# Patient Record
Sex: FEMALE
[De-identification: names, ages, dates, MRNs, and addresses within clinical notes are randomized per-mention and may not be internally consistent; named-entity substitution may affect disease eponyms.]

---

## 2018-10-24 ENCOUNTER — HOSPITAL ENCOUNTER (EMERGENCY)
Dept: HOSPITAL 14 - H.ER | Age: 48
Discharge: HOME | End: 2018-10-24
Payer: COMMERCIAL

## 2018-10-24 VITALS
SYSTOLIC BLOOD PRESSURE: 90 MMHG | TEMPERATURE: 97.6 F | RESPIRATION RATE: 18 BRPM | DIASTOLIC BLOOD PRESSURE: 48 MMHG | HEART RATE: 63 BPM

## 2018-10-24 DIAGNOSIS — N20.2: Primary | ICD-10-CM

## 2018-10-24 LAB
ALBUMIN SERPL-MCNC: 4.8 G/DL (ref 3.5–5)
ALBUMIN/GLOB SERPL: 1.3 {RATIO} (ref 1–2.1)
ALT SERPL-CCNC: 42 U/L (ref 9–52)
ANISOCYTOSIS BLD QL SMEAR: SLIGHT
AST SERPL-CCNC: 31 U/L (ref 14–36)
BASOPHILS # BLD AUTO: 0 K/UL (ref 0–0.2)
BASOPHILS NFR BLD: 0.2 % (ref 0–2)
BUN SERPL-MCNC: 17 MG/DL (ref 7–17)
CALCIUM SERPL-MCNC: 9.8 MG/DL (ref 8.4–10.2)
EOSINOPHIL # BLD AUTO: 0.1 K/UL (ref 0–0.7)
EOSINOPHIL NFR BLD: 0.4 % (ref 0–4)
ERYTHROCYTE [DISTWIDTH] IN BLOOD BY AUTOMATED COUNT: 13.2 % (ref 11.5–14.5)
GFR NON-AFRICAN AMERICAN: > 60
HGB BLD-MCNC: 12.8 G/DL (ref 12–16)
HYPOCHROMIC: SLIGHT
LYMPHOCYTE: 7 % (ref 20–50)
LYMPHOCYTES # BLD AUTO: 1.3 K/UL (ref 1–4.3)
LYMPHOCYTES NFR BLD AUTO: 8.8 % (ref 20–40)
MCH RBC QN AUTO: 30.8 PG (ref 27–31)
MCHC RBC AUTO-ENTMCNC: 33.7 G/DL (ref 33–37)
MCV RBC AUTO: 91.3 FL (ref 81–99)
MONOCYTE: 6 % (ref 0–10)
MONOCYTES # BLD: 0.7 K/UL (ref 0–0.8)
MONOCYTES NFR BLD: 5 % (ref 0–10)
NEUTROPHILS # BLD: 12.6 K/UL (ref 1.8–7)
NEUTROPHILS NFR BLD AUTO: 83 % (ref 42–75)
NEUTROPHILS NFR BLD AUTO: 85.6 % (ref 50–75)
NEUTS BAND NFR BLD: 4 % (ref 0–2)
NRBC BLD AUTO-RTO: 0.1 % (ref 0–0)
PLATELET # BLD EST: NORMAL 10*3/UL
PLATELET # BLD: 207 K/UL (ref 130–400)
PMV BLD AUTO: 8.9 FL (ref 7.2–11.7)
RBC # BLD AUTO: 4.17 MIL/UL (ref 3.8–5.2)
TOTAL CELLS COUNTED BLD: 100
WBC # BLD AUTO: 14.7 K/UL (ref 4.8–10.8)

## 2018-10-24 PROCEDURE — 96361 HYDRATE IV INFUSION ADD-ON: CPT

## 2018-10-24 PROCEDURE — 99283 EMERGENCY DEPT VISIT LOW MDM: CPT

## 2018-10-24 PROCEDURE — 81025 URINE PREGNANCY TEST: CPT

## 2018-10-24 PROCEDURE — 96374 THER/PROPH/DIAG INJ IV PUSH: CPT

## 2018-10-24 PROCEDURE — 85025 COMPLETE CBC W/AUTO DIFF WBC: CPT

## 2018-10-24 PROCEDURE — 80053 COMPREHEN METABOLIC PANEL: CPT

## 2018-10-24 PROCEDURE — 74176 CT ABD & PELVIS W/O CONTRAST: CPT

## 2018-10-24 NOTE — ED PDOC
HPI: Back


Time Seen by Provider: 10/24/18 18:22


Chief Complaint (Nursing): Back Pain


Additional Complaint(s): 





47 year old female with past medical history of kidney stones presents to the 

emergency department complaining of acute onset of right-sided back pain x 1 

hour. Patient describes pain as sharp, constant, with occasional radiation to 

the right flank and abdomen. Associated nausea and one episode of vomiting on 

arrival to the ED. Patient last had a kidney stone 30 years ago. Patient has not

taken any medication for pain. Denies fever, chills, diarrhea, hematochezia, 

hematemesis, urinary symptoms, headache, vision changes, chest pain, SOB, 

numbness, paresthesias. 





Past Medical History


Reviewed: Historical Data, Nursing Documentation, Vital Signs


Vital Signs: 


                                Last Vital Signs











Temp  98.5 F   10/24/18 17:47


 


Pulse  71   10/24/18 17:47


 


Resp  20   10/24/18 17:47


 


BP  135/68   10/24/18 17:47


 


Pulse Ox  100   10/24/18 17:47














- Medical History


PMH: Kidney Stones





- Family History


Family History: States: Unknown Family Hx





- Home Medications


Home Medications: 


                                Ambulatory Orders











 Medication  Instructions  Recorded


 


Ciprofloxacin [Cipro] 500 mg PO Q12H #13 tab 10/24/18


 


Ibuprofen [Motrin Tab] 600 mg PO Q6H PRN #30 tab 10/24/18


 


Tamsulosin [Flomax] 0.4 mg PO DAILY #7 cap 10/24/18


 


oxyCODONE/Acetaminophen [Percocet 1 tab PO Q6H PRN #10 tab 10/24/18





5/325 mg Tab]  














- Allergies


Allergies/Adverse Reactions: 


                                    Allergies











Allergy/AdvReac Type Severity Reaction Status Date / Time


 


iodine Allergy  RASH Verified 10/24/18 17:47


 


seafood Allergy  SWELLING Uncoded 10/24/18 17:47














Review of Systems


ROS Statement: Except As Marked, All Systems Reviewed And Found Negative


Constitutional: Negative for: Fever, Chills


Eyes: Negative for: Vision Change


Cardiovascular: Negative for: Chest Pain, Palpitations, Light Headedness


Respiratory: Negative for: Cough, Shortness of Breath


Gastrointestinal: Positive for: Nausea, Vomiting, Abdominal Pain.  Negative for:

Diarrhea, Constipation, Melena, Hematochezia, Hematemesis, Rectal Pain


Genitourinary Female: Negative for: Dysuria, Frequency, Incontinence, Hematuria,

Vaginal Discharge, Vaginal Bleeding


Musculoskeletal: Positive for: Back Pain (right).  Negative for: Neck Pain, Sh

oulder Pain, Arm Pain, Hand Pain, Leg Pain, Foot Pain


Skin: Negative for: Rash, Lesions, Bruising


Neurological: Negative for: Weakness, Numbness, Incoordination, Confusion, 

Headache, Dizziness





Physical Exam





- Reviewed


Nursing Documentation Reviewed: Yes


Vital Signs Reviewed: Yes





- Physical Exam


Appears: Positive for: Well, Non-toxic, No Acute Distress


Head Exam: Positive for: ATRAUMATIC, NORMAL INSPECTION, NORMOCEPHALIC


Skin: Positive for: Normal Color, Warm, DRY


Eye Exam: Positive for: EOMI, Normal appearance, PERRL


ENT: Positive for: Normal ENT Inspection


Neck: Positive for: Normal, Painless ROM


Cardiovascular/Chest: Positive for: Regular Rate, Rhythm


Respiratory: Positive for: CNT, Normal Breath Sounds


Pulses-Radial (L): 2+


Pulses-Radial (R): 2+


Gastrointestinal/Abdominal: Positive for: Normal Exam, Bowel Sounds 

(normoactive), Soft.  Negative for: Tenderness


Back: Positive for: Normal Inspection, R CVA Tenderness.  Negative for: 

Vertebral Tenderness, Decreased ROM, Muscle Spasm


Rectal: Positive for: Deferred


Extremity: Positive for: Normal ROM, Capillary Refill (<2z).  Negative for: 

Tenderness, Calf Tenderness, Deformity, Swelling


Lymphatic: Positive for: Normal Exam.  Negative for: Adenopathy


Neurologic/Psych: Positive for: Alert, Oriented, Gait (steady).  Negative for: 

Motor/Sensory Deficits





- Laboratory Results


Result Diagrams: 


                                 10/24/18 18:59





                                 10/24/18 18:59


Urine Pregnancy POC: Negative


Urine dip results: Positive for: Blood (moderate)





- ECG


O2 Sat by Pulse Oximetry: 100





Medical Decision Making


Medical Decision Making: 





Initial Plan:


--UA, culture


--CBC, CMP


--IVF


--Toradol


--Zofran





UA with moderate amount of blood, will get CT for stone search





On re-evaluation, pt describes pain as 1/10 without nausea. Pt resting 

comfortably in bed. 





1938


CT ABD / PELVIS W/O CONTRAST


FINDINGS: 


LUNG BASES: 


The lung bases appear clear. No pleural effusions are seen. 


LIVER: 


Unremarkable. 


GALLBLADDER AND BILE DUCTS: 


The gallbladder appears within normal limits. No radioopaque gallstones are s

een. No biliary ductal dilatation is evident. 


PANCREAS: 


Unremarkable. 


SPLEEN: 


Unremarkable. 


ADRENAL GLANDS: 


Unremarkable. 


KIDNEYS, URETERS, AND BLADDER: 


The kidneys appear within normal limits. There is no hydronephrosis or 

hydroureter.  There is an approximate 3 mm right UVJ calculus. 


STOMACH AND BOWEL: 


Unremarkable appearance of the stomach and bowel. No evidence of bowel 

obstruction. No evidence suggesting enteritis or colitis. 


APPENDIX: 


No evidence of acute appendicitis on CT examination. 


PERITONEUM: 


No free fluid. No free air. 


LYMPH NODES: 


No lymphadenopathy is evident. 


VASCULATURE: 


No evidence of abdominal aortic aneurysm. 


BONES: 


No aggressive appearing osseous lesion. No acute osseous pathology evident. 


IMPRESSION: 


No suspicious mass or lymphadenopathy or free fluid.  Approximate 3 mm right UVJ

calculus.  Clinical correlation advised.





Case and lab results discussed with Dr. Parson, who recommends discharge home 

with pain control, flomax, antibiotics, and urology followup. Will follow 

recommendations.


CBC remarkable for WBC count of 14.7. Dr. Parson made aware, continues to 

recommend discharge home.





Diagnostic studies and importance of followup discussed with patient, who 

understands and agrees with plan of care. Pt stable for discharge home.





Impression:


Kidney stone


Plan:


--Percocet, Ibuprofen


--Flomax


--Ciprofloxacin


--Increase fluids


--Followup with urology within 2 days


--Followup with PMD


--Return to ED for new or worsening symptoms





Disposition





- Clinical Impression


Clinical Impression: 


 Kidney stone








- Patient ED Disposition


Is Patient to be Admitted: No


Discussed With : Kaushik Parson


Comment: Diagnostic studies discussed. Recommends discharge home with pain 

control, antibiotics, flomax, and urology followup.


Counseled Patient/Family Regarding: Studies Performed, Diagnosis, Need For 

Followup, Rx Given





- Disposition


Referrals: 


Mason Duque MD [Medical Doctor] - 


Disposition: Routine/Home


Disposition Time: 22:00


Condition: IMPROVED


Additional Instructions: 


Take percocet every 6 hours as needed for pain


Take antibiotic every 12 hours for 1 week


Take Flomax every day for 1 week


FOllowup with urology within 2 days


Followup with primary doctor within 2 days


Return to ER for worsening or new symptoms


Prescriptions: 


Ciprofloxacin [Cipro] 500 mg PO Q12H #13 tab


Ibuprofen [Motrin Tab] 600 mg PO Q6H PRN #30 tab


 PRN Reason: Pain, Moderate (4-7)


oxyCODONE/Acetaminophen [Percocet 5/325 mg Tab] 1 tab PO Q6H PRN #10 tab


 PRN Reason: Pain, Severe (8-10)


Tamsulosin [Flomax] 0.4 mg PO DAILY #7 cap


Instructions:  Kidney Stones in Adults


Forms:  [facility] (English), [facility] ED School/Work Excuse

## 2018-10-25 NOTE — CT
Date of service: 



10/24/2018



PROCEDURE:  CT Abdomen and Pelvis without intravenous contrast



HISTORY:

Back pain.  Renal calculus disease suspected. 



COMPARISON:

None.



TECHNIQUE:

Unenhanced.  Neither IV nor oral contrast administered



Radiation dose:



Total exam DLP = 369.28 mGy-cm.



This CT exam was performed using one or more of the following dose 

reduction techniques: Automated exposure control, adjustment of the 

mA and/or kV according to patient size, and/or use of iterative 

reconstruction technique.



FINDINGS:



LOWER THORAX:

Unremarkable. 



LIVER:

Unremarkable. No gross lesion or ductal dilatation.  



GALLBLADDER AND BILE DUCTS:

Unremarkable. 



PANCREAS:

Unremarkable. No gross lesion or ductal dilatation.



SPLEEN:

Unremarkable. 



ADRENALS:

Unremarkable. No mass. 



KIDNEYS AND URETERS:

Unremarkable. No hydronephrosis. No solid mass. 



There is no evidence of hydronephrosis, right hydroureter related to 

3 mm calculus right ureterovesical junction. 



VASCULATURE:

Unremarkable. No aortic aneurysm. No atherosclerotic calcification or 

mural plaque present.



BOWEL:

Constipation without fecal impaction or obstruction.  



APPENDIX:

As visualized, no abnormalities to suggest acute appendicitis. No 

right lower quadrant inflammatory processes identified.



PERITONEUM:

Unremarkable. No free fluid. No free air. 



LYMPH NODES:

Unremarkable. No enlarged lymph nodes. 



BLADDER:

3 mm calculus right ureterovesical junction. 



REPRODUCTIVE:

Unremarkable. 



BONES:

No acute fracture. 



OTHER FINDINGS:

None.



IMPRESSION:

Nonobstructing calculus right ureterovesical junction measures 3 mm.



Additional benign and/or incidental findings described above.



________________________________________________



Concordant results (preliminary interpretation) provided by [facility].



Procedure Completed: 19:42



Preliminary Report: Dictated and Authenticated: 20:24.



Final Interpretation: 11:24.

## 2018-10-26 VITALS — OXYGEN SATURATION: 100 %
